# Patient Record
Sex: FEMALE
[De-identification: names, ages, dates, MRNs, and addresses within clinical notes are randomized per-mention and may not be internally consistent; named-entity substitution may affect disease eponyms.]

---

## 2023-08-16 ENCOUNTER — APPOINTMENT (OUTPATIENT)
Dept: ORTHOPEDIC SURGERY | Facility: CLINIC | Age: 63
End: 2023-08-16
Payer: COMMERCIAL

## 2023-08-16 DIAGNOSIS — Z86.79 PERSONAL HISTORY OF OTHER DISEASES OF THE CIRCULATORY SYSTEM: ICD-10-CM

## 2023-08-16 PROBLEM — Z00.00 ENCOUNTER FOR PREVENTIVE HEALTH EXAMINATION: Status: ACTIVE | Noted: 2023-08-16

## 2023-08-16 PROCEDURE — 29075 APPL CST ELBW FNGR SHORT ARM: CPT | Mod: RT

## 2023-08-16 PROCEDURE — 73110 X-RAY EXAM OF WRIST: CPT | Mod: RT

## 2023-08-16 PROCEDURE — 99203 OFFICE O/P NEW LOW 30 MIN: CPT | Mod: 25

## 2023-08-16 NOTE — IMAGING
[de-identified] : On examination of the left wrist, patient has significant generalized swelling, she was in a volar splint which this was removed in the office. Skin is intact. Patient has significant tenderness. Decreased range of motion of the wrist and digits. Patient is able to wiggle her fingers, but she has significant pain, patient is able to flex and extend the thumb with significant end of range pain. Unable to make a fist. Good capillary refill.  X-ray of the left wrist was brought by the patient and CD and reviewed in office today, this x-ray shows a comminuted intra-articular mildly displaced fracture with a volar component.  Post casting x-ray was taken in the office, showing no significant interval change in the alignment of the fracture.

## 2023-08-16 NOTE — DISCUSSION/SUMMARY
[de-identified] : Impression: Comminuted intra-articular volarly distal radius fracture.  Plan: Patient was advised for a surgical consult, patient was advised that the best treatment for her fracture would be surgical intervention, patient is hesitant for surgery. I explained to the patient that since her fracture is already off for a week or there is a urgency for her to see Dr. Quiros to have a surgical consult. Patient would like to treat this conservatively.  I strongly advised for the surgical consult. Patient was placed in a short arm cast, cast was placed from from the elbow to the hand. Fiberglass material was used. Patient was advised to keep the cast clean and dry.  Patient was given appointment to see Dr. Quiros this coming Friday.  Follow-up: 2 days with Dr. Quiros.

## 2023-08-16 NOTE — HISTORY OF PRESENT ILLNESS
[de-identified] : 62-year-old female here for evaluation of injury sustained to the right wrist, patient states that this injury happened about 7/20/2023, patient states that she was at her home she states that she rushed out of the door to go and catch the cat that run out of the house, she states that she tripped and fell down forward breaking the fall with her wrist onto a brick fence, patient states that she had significant pain, noticed some swelling, patient states that she applied ice and rested, she did not seek any medical care until about a week later, patient states she was [facility] the [facility] on August 15, 2023 and she was advised over fracture. Patient states that she is in severe pain.  Patient denies any significant medical problems, patient admits to hypertension, she only takes 25 mg of hydrochlorothiazide. 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

## 2023-08-18 ENCOUNTER — APPOINTMENT (OUTPATIENT)
Dept: ORTHOPEDIC SURGERY | Facility: CLINIC | Age: 63
End: 2023-08-18
Payer: COMMERCIAL

## 2023-08-18 PROCEDURE — 99205 OFFICE O/P NEW HI 60 MIN: CPT

## 2023-08-21 NOTE — ASSESSMENT
[FreeTextEntry1] : The patient comes in for a displaced fracture of distal end of right radius. On 8/7/23, She was trying to stop kittens from going out and she tripped, and she fell. She was placed in a fiberglass short arm cast on 8/16/23.   Right upper extremity: In short arm cast, which is fitting her well, full range of motion of fingers out of cast, neurovascular intact.  I discussed with the patient and the family that the nature of the fracture is one that would warrant surgery to restore length, alignment, and rotation. We discussed the r/b/a of surgical and nonsurgical treatment options.  The patient/family understand that without surgery they are at increased risk of arthritis, pain, loss of motion, loss of strength, ulnar loading/outer wrist pain and deformity.  The patient/family would like to avoid surgery understanding the risks. They understand that they can change their mind and I would be amenable to fixing the fracture primarily up to three weeks from the time of injury.  After that time, if they wanted fixation, I would let the fracture heal, see how they do, and perform malunion surgery if they are still interested.  The patient will follow up in 2-3 weeks for cast removal and new films.

## 2023-09-06 ENCOUNTER — RESULT CHARGE (OUTPATIENT)
Age: 63
End: 2023-09-06

## 2023-09-06 ENCOUNTER — APPOINTMENT (OUTPATIENT)
Dept: ORTHOPEDIC SURGERY | Facility: CLINIC | Age: 63
End: 2023-09-06
Payer: COMMERCIAL

## 2023-09-06 PROCEDURE — 73110 X-RAY EXAM OF WRIST: CPT | Mod: RT

## 2023-09-06 NOTE — HISTORY OF PRESENT ILLNESS
[de-identified] : Patient is a 63-year-old female here for repeat evaluation of her right wrist.  She is about 6 weeks status post a distal radius fracture.  She was in a short arm cast.  She states that last night, 9/5/2023, the cast slid off of her wrist.

## 2023-09-06 NOTE — PHYSICAL EXAM
[de-identified] : Physical exam of her right wrist: She has some swelling of the digits.  Negative ecchymosis.  She is minimally tender over the distal radius.  Nontender distal ulna.  She has limited range of motion of the wrist secondary to some pain and stiffness.  Sensory and motor are intact.

## 2023-09-06 NOTE — DISCUSSION/SUMMARY
[de-identified] : The patient had an appt in 2 days for cast off.  The patient was placed in a cock-up wrist brace to get acclimated to being out of the cast. It can be removed to work on range of motion and bathing. She understands that fractures take several weeks to heal.  Random residual pain can occur for 6 months to a year. She will continue to avoid any pushing, pulling, or heavy lifting. Follow up in about 3-4 weeks for repeat x-ray and evaluation. All questions were answered today.

## 2023-09-08 ENCOUNTER — APPOINTMENT (OUTPATIENT)
Dept: ORTHOPEDIC SURGERY | Facility: CLINIC | Age: 63
End: 2023-09-08

## 2023-10-06 ENCOUNTER — APPOINTMENT (OUTPATIENT)
Dept: ORTHOPEDIC SURGERY | Facility: CLINIC | Age: 63
End: 2023-10-06
Payer: COMMERCIAL

## 2023-10-06 DIAGNOSIS — S52.501A UNSPECIFIED FRACTURE OF THE LOWER END OF RIGHT RADIUS, INITIAL ENCOUNTER FOR CLOSED FRACTURE: ICD-10-CM

## 2023-10-06 PROCEDURE — 99213 OFFICE O/P EST LOW 20 MIN: CPT

## 2023-10-06 PROCEDURE — 73110 X-RAY EXAM OF WRIST: CPT | Mod: RT
